# Patient Record
Sex: MALE | Race: WHITE | NOT HISPANIC OR LATINO | URBAN - METROPOLITAN AREA
[De-identification: names, ages, dates, MRNs, and addresses within clinical notes are randomized per-mention and may not be internally consistent; named-entity substitution may affect disease eponyms.]

---

## 2018-10-02 ENCOUNTER — NEW PATIENT (OUTPATIENT)
Dept: URBAN - METROPOLITAN AREA CLINIC 24 | Facility: CLINIC | Age: 78
End: 2018-10-02
Payer: MEDICARE

## 2018-10-02 DIAGNOSIS — Z96.1 PRESENCE OF INTRAOCULAR LENS: ICD-10-CM

## 2018-10-02 PROCEDURE — 92134 CPTRZ OPH DX IMG PST SGM RTA: CPT | Performed by: OPTOMETRIST

## 2018-10-02 PROCEDURE — 92004 COMPRE OPH EXAM NEW PT 1/>: CPT | Performed by: OPTOMETRIST

## 2018-10-02 ASSESSMENT — VISUAL ACUITY
OS: 20/30
OD: 20/40

## 2018-10-02 ASSESSMENT — KERATOMETRY
OS: 42.38
OD: 42.19

## 2018-10-02 ASSESSMENT — INTRAOCULAR PRESSURE
OD: 12
OS: 12

## 2019-01-11 ENCOUNTER — FOLLOW UP ESTABLISHED (OUTPATIENT)
Dept: URBAN - METROPOLITAN AREA CLINIC 24 | Facility: CLINIC | Age: 79
End: 2019-01-11
Payer: MEDICARE

## 2019-01-11 PROCEDURE — 76514 ECHO EXAM OF EYE THICKNESS: CPT | Performed by: OPTOMETRIST

## 2019-01-11 PROCEDURE — 92012 INTRM OPH EXAM EST PATIENT: CPT | Performed by: OPTOMETRIST

## 2019-01-11 PROCEDURE — 92083 EXTENDED VISUAL FIELD XM: CPT | Performed by: OPTOMETRIST

## 2019-01-11 PROCEDURE — 92015 DETERMINE REFRACTIVE STATE: CPT | Performed by: OPTOMETRIST

## 2019-01-11 RX ORDER — LATANOPROST 50 UG/ML
0.005 % SOLUTION OPHTHALMIC
Qty: 1 | Refills: 4 | Status: INACTIVE
Start: 2019-01-11 | End: 2019-07-22

## 2019-01-11 ASSESSMENT — KERATOMETRY
OS: 42.32
OD: 42.14

## 2019-01-11 ASSESSMENT — INTRAOCULAR PRESSURE
OS: 16
OD: 14

## 2019-05-07 ENCOUNTER — FOLLOW UP ESTABLISHED (OUTPATIENT)
Dept: URBAN - METROPOLITAN AREA CLINIC 24 | Facility: CLINIC | Age: 79
End: 2019-05-07
Payer: MEDICARE

## 2019-05-07 PROCEDURE — 92012 INTRM OPH EXAM EST PATIENT: CPT | Performed by: OPTOMETRIST

## 2019-05-07 ASSESSMENT — INTRAOCULAR PRESSURE
OS: 16
OD: 15
OS: 18
OD: 18

## 2019-09-03 ENCOUNTER — FOLLOW UP ESTABLISHED (OUTPATIENT)
Dept: URBAN - METROPOLITAN AREA CLINIC 24 | Facility: CLINIC | Age: 79
End: 2019-09-03
Payer: MEDICARE

## 2019-09-03 PROCEDURE — 92012 INTRM OPH EXAM EST PATIENT: CPT | Performed by: OPTOMETRIST

## 2019-09-03 PROCEDURE — 92133 CPTRZD OPH DX IMG PST SGM ON: CPT | Performed by: OPTOMETRIST

## 2019-09-03 ASSESSMENT — INTRAOCULAR PRESSURE
OD: 18
OS: 16
OS: 18
OD: 17

## 2020-02-11 ENCOUNTER — FOLLOW UP ESTABLISHED (OUTPATIENT)
Dept: URBAN - METROPOLITAN AREA CLINIC 24 | Facility: CLINIC | Age: 80
End: 2020-02-11
Payer: MEDICARE

## 2020-02-11 DIAGNOSIS — H04.123 TEAR FILM INSUFFICIENCY OF BILATERAL LACRIMAL GLANDS: ICD-10-CM

## 2020-02-11 DIAGNOSIS — H35.371 PUCKERING OF MACULA, RIGHT EYE: ICD-10-CM

## 2020-02-11 DIAGNOSIS — H52.4 PRESBYOPIA: ICD-10-CM

## 2020-02-11 DIAGNOSIS — H40.013 OPEN ANGLE WITH BORDERLINE FINDINGS, LOW RISK, BILATERAL: Primary | ICD-10-CM

## 2020-02-11 PROCEDURE — 92134 CPTRZ OPH DX IMG PST SGM RTA: CPT | Performed by: OPTOMETRIST

## 2020-02-11 PROCEDURE — 92014 COMPRE OPH EXAM EST PT 1/>: CPT | Performed by: OPTOMETRIST

## 2020-02-11 ASSESSMENT — INTRAOCULAR PRESSURE
OD: 17
OS: 16

## 2020-02-11 ASSESSMENT — KERATOMETRY
OD: 42.16
OS: 42.38

## 2020-02-11 ASSESSMENT — VISUAL ACUITY
OD: 20/30
OS: 20/25

## 2020-08-21 ENCOUNTER — FOLLOW UP ESTABLISHED (OUTPATIENT)
Dept: URBAN - METROPOLITAN AREA CLINIC 26 | Facility: CLINIC | Age: 80
End: 2020-08-21
Payer: MEDICARE

## 2020-08-21 PROCEDURE — 92083 EXTENDED VISUAL FIELD XM: CPT | Performed by: OPTOMETRIST

## 2020-08-21 PROCEDURE — 92012 INTRM OPH EXAM EST PATIENT: CPT | Performed by: OPTOMETRIST

## 2020-08-21 ASSESSMENT — INTRAOCULAR PRESSURE
OD: 16
OS: 17

## 2021-08-18 ENCOUNTER — OFFICE VISIT (OUTPATIENT)
Dept: URBAN - METROPOLITAN AREA CLINIC 24 | Facility: CLINIC | Age: 81
End: 2021-08-18
Payer: MEDICARE

## 2021-08-18 DIAGNOSIS — H43.393 OTHER VITREOUS OPACITIES, BILATERAL: ICD-10-CM

## 2021-08-18 PROCEDURE — 92014 COMPRE OPH EXAM EST PT 1/>: CPT | Performed by: OPTOMETRIST

## 2021-08-18 PROCEDURE — 92133 CPTRZD OPH DX IMG PST SGM ON: CPT | Performed by: OPTOMETRIST

## 2021-08-18 PROCEDURE — 92134 CPTRZ OPH DX IMG PST SGM RTA: CPT | Performed by: OPTOMETRIST

## 2021-08-18 RX ORDER — LATANOPROST 50 UG/ML
0.005 % SOLUTION OPHTHALMIC
Qty: 7.5 | Refills: 1 | Status: INACTIVE
Start: 2021-08-18 | End: 2022-02-23

## 2021-08-18 ASSESSMENT — VISUAL ACUITY
OS: 20/40
OD: 20/40

## 2021-08-18 ASSESSMENT — INTRAOCULAR PRESSURE
OS: 11
OD: 11

## 2021-08-18 ASSESSMENT — KERATOMETRY
OD: 42.12
OS: 42.38

## 2021-08-18 NOTE — IMPRESSION/PLAN
Impression: Puckering of macula, right eye: H35.371.  Plan: Stable, continue to monitor Lab: [id number] Lab: [id number]

## 2021-08-18 NOTE — IMPRESSION/PLAN
Impression: Open angle with borderline findings, low risk, bilateral
denies family hx Plan: Secondary to cupping OU
IOP: 11/11 with __ pachs OCT RNFL (8/18/21):
    - OD: 74 WNL
    - OS: 75 WNL Discussed findings in detail with patient. 
Continue Latanoprost QPM OU

## 2022-02-21 ENCOUNTER — OFFICE VISIT (OUTPATIENT)
Dept: URBAN - METROPOLITAN AREA CLINIC 26 | Facility: CLINIC | Age: 82
End: 2022-02-21
Payer: MEDICARE

## 2022-02-21 PROCEDURE — 92083 EXTENDED VISUAL FIELD XM: CPT | Performed by: OPTOMETRIST

## 2022-02-21 PROCEDURE — 99213 OFFICE O/P EST LOW 20 MIN: CPT | Performed by: OPTOMETRIST

## 2022-02-21 ASSESSMENT — INTRAOCULAR PRESSURE
OD: 10
OS: 11

## 2022-02-21 NOTE — IMPRESSION/PLAN
Impression: Open angle with borderline findings, low risk, bilateral
denies family hx Plan: Secondary to cupping OU. currently taking Latanoprost qhs OU. IOP: 11/11 OCT RNFL (8/18/21):OD: 74 WNL OS: 75 WNL vf 24-2 (02/21/22) od: poor reliability, superior and inferior arcuate os: poor reliability, superior and inferior arcuate
IOP controlled with current medication Continue Latanoprost QPM OU
rtc 6 months complete exam and probable vf 24-2

## 2023-04-13 ENCOUNTER — OFFICE VISIT (OUTPATIENT)
Dept: URBAN - METROPOLITAN AREA CLINIC 26 | Facility: CLINIC | Age: 83
End: 2023-04-13
Payer: MEDICARE

## 2023-04-13 DIAGNOSIS — H35.371 PUCKERING OF MACULA, RIGHT EYE: ICD-10-CM

## 2023-04-13 DIAGNOSIS — H40.013 OPEN ANGLE WITH BORDERLINE FINDINGS, LOW RISK, BILATERAL: Primary | ICD-10-CM

## 2023-04-13 DIAGNOSIS — H43.393 OTHER VITREOUS OPACITIES, BILATERAL: ICD-10-CM

## 2023-04-13 DIAGNOSIS — H04.123 TEAR FILM INSUFFICIENCY OF BILATERAL LACRIMAL GLANDS: ICD-10-CM

## 2023-04-13 DIAGNOSIS — Z96.1 PRESENCE OF INTRAOCULAR LENS: ICD-10-CM

## 2023-04-13 DIAGNOSIS — H02.009 ENTROPION OF EYELID: ICD-10-CM

## 2023-04-13 PROCEDURE — 92134 CPTRZ OPH DX IMG PST SGM RTA: CPT | Performed by: OPTOMETRIST

## 2023-04-13 PROCEDURE — 92083 EXTENDED VISUAL FIELD XM: CPT | Performed by: OPTOMETRIST

## 2023-04-13 PROCEDURE — 92014 COMPRE OPH EXAM EST PT 1/>: CPT | Performed by: OPTOMETRIST

## 2023-04-13 ASSESSMENT — VISUAL ACUITY
OD: 20/50
OS: 20/40

## 2023-04-13 ASSESSMENT — INTRAOCULAR PRESSURE
OS: 11
OD: 11

## 2023-04-13 NOTE — IMPRESSION/PLAN
Impression: Open angle with borderline findings, low risk, bilateral
denies family hx Plan: Secondary to cupping OU. currently taking Latanoprost qhs OU. IOP: 11/11 OCT RNFL (8/18/21):OD: 74 WNL OS: 75 WNL vf 24-2 (04/13/23) od: poor reliability, superior and inferior arcuate os: poor reliability, superior and inferior arcuate (stable from previous) IOP controlled with current medication Continue Latanoprost QPM OU
rtc 6 months IOP check and rnfl oct

## 2023-04-13 NOTE — IMPRESSION/PLAN
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 pt and wife ed of findings and cause of increased irritation OS. recommend led evaluation with oculoplastics - pt defers. rx maxitrol ayanna qhs OS. at's qid OU. observe.